# Patient Record
Sex: FEMALE | NOT HISPANIC OR LATINO
[De-identification: names, ages, dates, MRNs, and addresses within clinical notes are randomized per-mention and may not be internally consistent; named-entity substitution may affect disease eponyms.]

---

## 2019-05-20 ENCOUNTER — APPOINTMENT (OUTPATIENT)
Dept: OTOLARYNGOLOGY | Facility: CLINIC | Age: 24
End: 2019-05-20
Payer: COMMERCIAL

## 2019-05-20 DIAGNOSIS — Z86.69 PERSONAL HISTORY OF OTHER DISEASES OF THE NERVOUS SYSTEM AND SENSE ORGANS: ICD-10-CM

## 2019-05-20 DIAGNOSIS — H90.11 CONDUCTIVE HEARING LOSS, UNILATERAL, RIGHT EAR, WITH UNRESTRICTED HEARING ON THE CONTRALATERAL SIDE: ICD-10-CM

## 2019-05-20 PROCEDURE — 92557 COMPREHENSIVE HEARING TEST: CPT

## 2019-05-20 PROCEDURE — 99203 OFFICE O/P NEW LOW 30 MIN: CPT

## 2019-05-20 PROCEDURE — 92567 TYMPANOMETRY: CPT

## 2019-05-20 RX ORDER — VORTIOXETINE 20 MG/1
TABLET, FILM COATED ORAL
Refills: 0 | Status: ACTIVE | COMMUNITY

## 2019-05-20 RX ORDER — NORETHINDRONE ACETATE AND ETHINYL ESTRADIOL 1MG-20(24)
1-20 KIT ORAL
Qty: 84 | Refills: 0 | Status: ACTIVE | COMMUNITY
Start: 2019-01-14

## 2019-05-20 RX ORDER — METHYLPREDNISOLONE 4 MG/1
4 TABLET ORAL
Qty: 40 | Refills: 1 | Status: ACTIVE | COMMUNITY
Start: 2019-05-20 | End: 1900-01-01

## 2019-05-20 RX ORDER — PREDNISONE 10 MG/1
10 TABLET ORAL
Qty: 20 | Refills: 0 | Status: ACTIVE | COMMUNITY
Start: 2019-05-18

## 2019-05-20 RX ORDER — NORETHINDRONE ACETATE AND ETHINYL ESTRADIOL 1.5-30(21)
KIT ORAL
Refills: 0 | Status: ACTIVE | COMMUNITY

## 2019-05-20 RX ORDER — FLUTICASONE PROPIONATE 50 UG/1
50 SPRAY, METERED NASAL
Qty: 16 | Refills: 0 | Status: ACTIVE | COMMUNITY
Start: 2019-01-24

## 2019-05-20 RX ORDER — AZITHROMYCIN 250 MG/1
250 TABLET, FILM COATED ORAL
Qty: 6 | Refills: 0 | Status: ACTIVE | COMMUNITY
Start: 2019-05-18

## 2019-05-20 RX ORDER — BROMPHENIRAMINE MALEATE, PSEUDOEPHEDRINE HYDROCHLORIDE, 2; 30; 10 MG/5ML; MG/5ML; MG/5ML
30-2-10 SYRUP ORAL
Qty: 280 | Refills: 0 | Status: ACTIVE | COMMUNITY
Start: 2019-05-17

## 2019-05-20 RX ORDER — CEPHALEXIN 250 MG/1
250 CAPSULE ORAL
Qty: 30 | Refills: 0 | Status: ACTIVE | COMMUNITY
Start: 2018-12-12

## 2019-05-20 RX ORDER — VORTIOXETINE 20 MG/1
20 TABLET, FILM COATED ORAL
Qty: 30 | Refills: 0 | Status: ACTIVE | COMMUNITY
Start: 2019-02-05

## 2019-05-20 RX ORDER — AZELASTINE HYDROCHLORIDE 137 UG/1
137 SPRAY, METERED NASAL
Qty: 30 | Refills: 0 | Status: ACTIVE | COMMUNITY
Start: 2019-01-24

## 2019-05-20 RX ORDER — ALBUTEROL 90 MCG
AEROSOL (GRAM) INHALATION
Refills: 0 | Status: ACTIVE | COMMUNITY

## 2019-05-20 RX ORDER — PREDNISONE 20 MG/1
20 TABLET ORAL
Refills: 0 | Status: ACTIVE | COMMUNITY

## 2019-05-20 RX ORDER — ALBUTEROL SULFATE 90 UG/1
108 (90 BASE) INHALANT RESPIRATORY (INHALATION)
Qty: 8 | Refills: 0 | Status: ACTIVE | COMMUNITY
Start: 2019-05-18

## 2019-05-20 NOTE — ASSESSMENT
[FreeTextEntry1] : Showed bilateral serous otitis media. She will be treated with a higher burst of methylprednisolone and then she took. She will follow up with me in one week. I did offer her myringotomy and drainage which he wants to defer at this time.

## 2019-05-20 NOTE — HISTORY OF PRESENT ILLNESS
[de-identified] : This is an initial office visit for this woman who is present with her aunt today. Her chief complaint is "clogged ears".\par She reports that 6 days ago she developed a cough. She was seen in urgent care and they suggested supportive care.\par 2 days later she re-presented to the urgent care Center and she was given azithromycin, albuterol and prednisone.\par \par She continues to complain of decreased hearing bilaterally worse on the right than the left. She does have a history of otitis media the child.

## 2019-05-30 ENCOUNTER — APPOINTMENT (OUTPATIENT)
Dept: OTOLARYNGOLOGY | Facility: CLINIC | Age: 24
End: 2019-05-30

## 2019-07-02 PROBLEM — H90.11 CONDUCTIVE HEARING LOSS OF RIGHT EAR WITH UNRESTRICTED HEARING OF LEFT EAR: Status: ACTIVE | Noted: 2019-07-02

## 2020-12-21 PROBLEM — Z86.69 HISTORY OF ACUTE OTITIS MEDIA: Status: RESOLVED | Noted: 2019-05-20 | Resolved: 2020-12-21

## 2023-04-26 ENCOUNTER — APPOINTMENT (OUTPATIENT)
Dept: UROLOGY | Facility: CLINIC | Age: 28
End: 2023-04-26
Payer: COMMERCIAL

## 2023-04-26 ENCOUNTER — TRANSCRIPTION ENCOUNTER (OUTPATIENT)
Age: 28
End: 2023-04-26

## 2023-04-26 VITALS
TEMPERATURE: 97.6 F | WEIGHT: 115 LBS | OXYGEN SATURATION: 99 % | HEART RATE: 69 BPM | SYSTOLIC BLOOD PRESSURE: 112 MMHG | DIASTOLIC BLOOD PRESSURE: 76 MMHG

## 2023-04-26 DIAGNOSIS — R10.2 PELVIC AND PERINEAL PAIN: ICD-10-CM

## 2023-04-26 DIAGNOSIS — N39.0 URINARY TRACT INFECTION, SITE NOT SPECIFIED: ICD-10-CM

## 2023-04-26 PROCEDURE — 99204 OFFICE O/P NEW MOD 45 MIN: CPT

## 2023-04-26 PROCEDURE — 81003 URINALYSIS AUTO W/O SCOPE: CPT | Mod: QW

## 2023-04-28 LAB
BILIRUB UR QL STRIP: NORMAL
CLARITY UR: CLEAR
COLLECTION METHOD: NORMAL
GLUCOSE UR-MCNC: NORMAL
HCG UR QL: 0.2 EU/DL
HGB UR QL STRIP.AUTO: NORMAL
KETONES UR-MCNC: NORMAL
LEUKOCYTE ESTERASE UR QL STRIP: NORMAL
NITRITE UR QL STRIP: NORMAL
PH UR STRIP: 7
PROT UR STRIP-MCNC: NORMAL
SP GR UR STRIP: 1.01

## 2023-04-28 RX ORDER — DIAZEPAM 10 MG/1
10 TABLET ORAL
Qty: 30 | Refills: 0 | Status: ACTIVE | COMMUNITY
Start: 2023-04-28 | End: 1900-01-01

## 2023-05-01 NOTE — HISTORY OF PRESENT ILLNESS
[FreeTextEntry1] : Pt is a 28 yo F  who presents today with persistent suprapubic and urethral pain more bothersome during the night. Her symptoms began in 2023.  She was initially treated for a UTI with a course of Macrobid by her GYN. She was then seen by urologist Dr. Puente who recommended post coital abx and performed a urethral dilation in office on 3/29/23. Patient reports having no relief after urethral dilation. She has also tried AZO and Advil 3x daily with little to no relief.  \par \par Of note: she has undergone three urethral dilation (last dilation 3/29/23) \par \par Denies dyspareunia \par 1 sexual partner \par \par Udip: Small leuks\par \par PMH: Urethral dilation, Recurrent UTI\par PSH: \par FH: Lung (grandmother), Breast (grandmother)\par SH: single, no children, non- smoker, social drinker\par \par Habits: Voids 2-3x daily, denies nocturia, 2 16 oz water, 1 cup coffee\par \par Meds: Birth control, Post Coital abx (Keflex 250mg)\par Allergies: NKDA \par \par Previous Urologist: Teresa Puente\par GYN: Dr. Keenan & Radha Acuña ([facility])\par \par

## 2023-05-01 NOTE — ASSESSMENT
[FreeTextEntry1] : Pt is a 28 yo F  who presents today with persistent suprapubic and urethral pain more bothersome during the night. Her symptoms began in 2023 after being treated for a UTI.   I believer there is a component of pelvic floor spasm that is contributing to her symptoms. \par \par The patient and I discussed what the pelvic floor is and what pelvic floor dysfunction is. \par \par I explained that the pelvic floor is a group of muscles that links the "front" of your core (abs, obliques) to the "back" of your core (lats, erectors). It is a group of muscles that most people have poor awareness of. It includes (anteriorly) the superficial and deep transverse perineals, the ischiocavernosus, and bulbospongiosus. Posteriorly it includes the levators, pubococcygeus, iliococcygeus, coccygeus, and levators.\par \par Because the urethra, vagina and rectum traverse these muscles, non-relaxation of the pelvic floor can cause vaginal, urinary and bowel issues. Additionally the bladder sits on top of the pelvic floor and can be affected by PF abnormalities. And because these muscles also have bony attachments, PFD can cause lower back pelvic, suprapubic and hip pain.\par \par While many patients have PFD for unclear reasons, some patients have a h/o horseback riding or sexual abuse/assault. In many patients, the condition of PFD may precede symptoms by quite some time.\par \par Treatment for PFD is multimodal. It almost always requires physical therapy and biofeedback. Intravaginal relaxing agents and trigger point injections as well as stress reduction and bowel regimens can be used as adjuncts. Treatment may take quite some time and requires patience: the patient must first learn the muscles affected and then work on relaxing them. There are multiple specialists that can be involved in this treatment.\par \par We will start with the following: \par \par 1. Urine was sent for urine culture \par 2. PFPT was ordered. \par 3. Valium/Baclofen vaginal suppository ordered. Patient advised to take as needed at night. \par \par Patient will f/u in 6 weeks if her urinary symptoms do not improve with vaginal suppository and PFPT. \par \par Patient expressed understanding.

## 2023-05-02 LAB — BACTERIA UR CULT: ABNORMAL

## 2023-09-18 ENCOUNTER — APPOINTMENT (OUTPATIENT)
Dept: OTOLARYNGOLOGY | Facility: CLINIC | Age: 28
End: 2023-09-18
Payer: COMMERCIAL

## 2023-09-18 DIAGNOSIS — H69.81 OTHER SPECIFIED DISORDERS OF EUSTACHIAN TUBE, RIGHT EAR: ICD-10-CM

## 2023-09-18 DIAGNOSIS — M26.609 UNSPECIFIED TEMPOROMANDIBULAR JOINT DISORDER: ICD-10-CM

## 2023-09-18 DIAGNOSIS — H60.8X3 OTHER OTITIS EXTERNA, BILATERAL: ICD-10-CM

## 2023-09-18 PROCEDURE — 99203 OFFICE O/P NEW LOW 30 MIN: CPT

## 2023-09-18 RX ORDER — HYDROCORTISONE AND ACETIC ACID OTIC 20.75; 10.375 MG/ML; MG/ML
1-2 SOLUTION AURICULAR (OTIC) TWICE DAILY
Qty: 1 | Refills: 3 | Status: ACTIVE | COMMUNITY
Start: 2023-09-18 | End: 1900-01-01

## 2023-12-05 ENCOUNTER — NON-APPOINTMENT (OUTPATIENT)
Age: 28
End: 2023-12-05

## 2023-12-05 RX ORDER — SULFAMETHOXAZOLE AND TRIMETHOPRIM 800; 160 MG/1; MG/1
800-160 TABLET ORAL TWICE DAILY
Qty: 10 | Refills: 0 | Status: ACTIVE | COMMUNITY
Start: 2023-05-02 | End: 1900-01-01

## 2023-12-11 ENCOUNTER — NON-APPOINTMENT (OUTPATIENT)
Age: 28
End: 2023-12-11

## 2023-12-11 LAB — BACTERIA UR CULT: ABNORMAL

## 2023-12-11 RX ORDER — CIPROFLOXACIN HYDROCHLORIDE 500 MG/1
500 TABLET, FILM COATED ORAL
Qty: 6 | Refills: 0 | Status: ACTIVE | COMMUNITY
Start: 2023-12-11 | End: 1900-01-01